# Patient Record
(demographics unavailable — no encounter records)

---

## 2025-01-15 NOTE — PHYSICAL EXAM
[de-identified] : Examination of the left foot and ankle is as follows: INSPECTION: swelling, ecchymosis of foot and ankle, moderate swelling of dorsal foot and lateral ankle, but no abrasion, laceration, no erythema PALPATION: lateral ligament tenderness ROM: plantarflexion 20 degrees, inversion 15 degrees, eversion 10 degrees, dorsiflexion 10 degrees STRENGTH: dorsiflexion 4/5, plantar flexion 4/5, inversion 4/5, eversion 4/5, EHL 5/5, FHL 5/5 VASCULAR: dorsalis pedis pulse: 2+, posterior tibialis pulse: 2+ NEURO: Sensation present to light touch in all distributions GAIT: antalgic, ambulation without assisted devices   X-rays of the left ankle is as follows: Ankle 3 view AP/Lateral/Oblique: No fractures, subluxations or dislocations. No major abnormalities.

## 2025-01-15 NOTE — ASSESSMENT
[FreeTextEntry1] : 13yoF with left ankle sprain.  Recommend nonsurgical management.    -wbat LLE in ankle aircast -no gym/sports note given -Rest, ice, compression, elevation, NSAIDs PRN for pain.  -All questions answered -F/u 4 weeks  The patient was advised to consult with their primary medical provider prior to initiation of any new medications to reduce the risk of adverse effects specific to their long-term home medications and medical history. The risk of gastrointestinal irritation and kidney injury specific to long-term NSAID use was discussed.

## 2025-01-15 NOTE — HISTORY OF PRESENT ILLNESS
[Sudden] : sudden [9] : 9 [0] : 0 [Dull/Aching] : dull/aching [Stabbing] : stabbing [Throbbing] : throbbing [Intermittent] : intermittent [Household chores] : household chores [Leisure] : leisure [Social interactions] : social interactions [Rest] : rest [Student] : Work status: student [de-identified] : Patient here for left ankle. Patient states she was running sideways and rolled ankle on 1/14/2025. Patient states pain is in the lateral aspect of the ankle that is stabbing with weight bearing/ROM. Patient came into office [facility] with crutches and air cast. Patient went to [facility] for x-ray but did not bring CD.  [] : Post Surgical Visit: no [FreeTextEntry1] : Left ankle  [FreeTextEntry3] : 1/14/2025 [FreeTextEntry5] : Patient states she was running sideways and rolled ankle  [de-identified] : Movement

## 2025-02-12 NOTE — ASSESSMENT
[FreeTextEntry1] : 12 yo female presenting today for follow up of left ankle ATFL sprain. Patient notes that she feels 90% improved today but mother wishes for her to pursue PT prior to returning back to basketball. Patient able to perform jumping jacks without pain or difficulty. -Rx PT given today -school gym/sports note given today -Activities as tolerated, no restrictions -Rest, ice, compression, elevation, NSAIDs PRN for pain.  -All questions answered -F/u PRN  The diagnosis was explained in detail. The potential non-surgical and surgical treatments were reviewed. The relative risks and benefits of each option were considered relative to the patients age, activity level, medical history, symptom severity and previously attempted treatments.  The patient was advised to consult with their primary medical provider prior to initiation of any new medications to reduce the risk of adverse effects specific to their long-term home medications and medical history. The risk of gastrointestinal irritation and kidney injury specific to long-term NSAID use was discussed.  Entered by Darrius Garrido PA-C acting as scribe. Dr. Khan Attestation The documentation recorded by the scribe, in my presence, accurately reflects the service I, Dr. Khan, personally performed, and the decisions made by me with my edits as appropriate.

## 2025-02-12 NOTE — PHYSICAL EXAM
[de-identified] : Examination of the left foot and ankle is as follows: INSPECTION: mild swelling over lateral ankle, but no ecchymosis, but no abrasion, laceration, no erythema PALPATION: no lateral ligament tenderness ROM: plantarflexion 40 degrees, inversion 30 degrees, eversion 20 degrees, dorsiflexion 20 degrees STRENGTH: dorsiflexion 5/5, plantar flexion 5/5, inversion 5/5, eversion 5/5, EHL 5/5, FHL 5/5 VASCULAR: dorsalis pedis pulse: 2+, posterior tibialis pulse: 2+ NEURO: Sensation present to light touch in all distributions GAIT: non-antalgic, ambulation without assisted devices
